# Patient Record
Sex: FEMALE | Race: BLACK OR AFRICAN AMERICAN | NOT HISPANIC OR LATINO | Employment: FULL TIME | URBAN - METROPOLITAN AREA
[De-identification: names, ages, dates, MRNs, and addresses within clinical notes are randomized per-mention and may not be internally consistent; named-entity substitution may affect disease eponyms.]

---

## 2017-02-15 ENCOUNTER — TELEPHONE (OUTPATIENT)
Dept: OBSTETRICS AND GYNECOLOGY | Facility: CLINIC | Age: 37
End: 2017-02-15

## 2017-02-15 DIAGNOSIS — Z36.89 ESTABLISH GESTATIONAL AGE, ULTRASOUND: Primary | ICD-10-CM

## 2017-02-15 NOTE — TELEPHONE ENCOUNTER
----- Message from Robert Pedroza sent at 2/14/2017  3:04 PM CST -----  Contact: Pt  X_ 1st Request  _ 2nd Request  _ 3rd Request    Who: FRANDY SMITH [[id number]]    Why: Patient would like to schedule her initial OB appointment, [facility] 12/14    What Number to Call Back: [phone]    When to Expect a call back: (Before the end of the day)  -- if call after 3:00 call back will be tomorrow.

## 2017-02-15 NOTE — TELEPHONE ENCOUNTER
Patient has been scheduled to see Raquel CLARK for New gyn visit. She report LMP as 12/14/16. Pt has also been scheduled for the next available clinic dating u/s 3/7/17.

## 2017-02-21 ENCOUNTER — LAB VISIT (OUTPATIENT)
Dept: LAB | Facility: OTHER | Age: 37
End: 2017-02-21
Attending: NURSE PRACTITIONER
Payer: MEDICAID

## 2017-02-21 ENCOUNTER — OFFICE VISIT (OUTPATIENT)
Dept: OBSTETRICS AND GYNECOLOGY | Facility: CLINIC | Age: 37
End: 2017-02-21
Payer: MEDICAID

## 2017-02-21 ENCOUNTER — TELEPHONE (OUTPATIENT)
Dept: OBSTETRICS AND GYNECOLOGY | Facility: CLINIC | Age: 37
End: 2017-02-21

## 2017-02-21 VITALS
BODY MASS INDEX: 33.07 KG/M2 | WEIGHT: 179.69 LBS | DIASTOLIC BLOOD PRESSURE: 72 MMHG | HEIGHT: 62 IN | SYSTOLIC BLOOD PRESSURE: 116 MMHG

## 2017-02-21 DIAGNOSIS — O98.419 CHRONIC HEPATITIS C DURING PREGNANCY, ANTEPARTUM: ICD-10-CM

## 2017-02-21 DIAGNOSIS — Z32.01 POSITIVE PREGNANCY TEST: ICD-10-CM

## 2017-02-21 DIAGNOSIS — Z12.4 PAP SMEAR FOR CERVICAL CANCER SCREENING: ICD-10-CM

## 2017-02-21 DIAGNOSIS — B18.2 CHRONIC HEPATITIS C DURING PREGNANCY, ANTEPARTUM: ICD-10-CM

## 2017-02-21 DIAGNOSIS — N91.5 OLIGOMENORRHEA: Primary | ICD-10-CM

## 2017-02-21 DIAGNOSIS — O98.419 CHRONIC HEPATITIS C AFFECTING PREGNANCY, ANTEPARTUM: ICD-10-CM

## 2017-02-21 DIAGNOSIS — Z72.0 TOBACCO ABUSE: ICD-10-CM

## 2017-02-21 DIAGNOSIS — B18.2 CHRONIC HEPATITIS C AFFECTING PREGNANCY, ANTEPARTUM: ICD-10-CM

## 2017-02-21 DIAGNOSIS — N91.5 OLIGOMENORRHEA: ICD-10-CM

## 2017-02-21 LAB
ABO + RH BLD: NORMAL
ALBUMIN SERPL BCP-MCNC: 3.5 G/DL
ALP SERPL-CCNC: 79 U/L
ALT SERPL W/O P-5'-P-CCNC: 39 U/L
AMPHET+METHAMPHET UR QL: NEGATIVE
ANION GAP SERPL CALC-SCNC: 6 MMOL/L
AST SERPL-CCNC: 28 U/L
B-HCG UR QL: POSITIVE
BARBITURATES UR QL SCN>200 NG/ML: NEGATIVE
BASOPHILS # BLD AUTO: 0.01 K/UL
BASOPHILS NFR BLD: 0.1 %
BENZODIAZ UR QL SCN>200 NG/ML: NEGATIVE
BILIRUB SERPL-MCNC: 0.3 MG/DL
BLD GP AB SCN CELLS X3 SERPL QL: NORMAL
BUN SERPL-MCNC: 13 MG/DL
BZE UR QL SCN: NEGATIVE
CALCIUM SERPL-MCNC: 8.9 MG/DL
CANNABINOIDS UR QL SCN: NEGATIVE
CHLORIDE SERPL-SCNC: 104 MMOL/L
CO2 SERPL-SCNC: 25 MMOL/L
CREAT SERPL-MCNC: 0.8 MG/DL
CREAT UR-MCNC: 59 MG/DL
CTP QC/QA: YES
DIFFERENTIAL METHOD: ABNORMAL
EOSINOPHIL # BLD AUTO: 0.3 K/UL
EOSINOPHIL NFR BLD: 3.9 %
ERYTHROCYTE [DISTWIDTH] IN BLOOD BY AUTOMATED COUNT: 18 %
EST. GFR  (AFRICAN AMERICAN): >60 ML/MIN/1.73 M^2
EST. GFR  (NON AFRICAN AMERICAN): >60 ML/MIN/1.73 M^2
ETHANOL UR-MCNC: <10 MG/DL
GLUCOSE SERPL-MCNC: 83 MG/DL
HBV SURFACE AG SERPL QL IA: NEGATIVE
HCT VFR BLD AUTO: 34.4 %
HGB BLD-MCNC: 11.4 G/DL
HGB S BLD QL SOLY: NEGATIVE
HIV 1+2 AB+HIV1 P24 AG SERPL QL IA: NEGATIVE
LYMPHOCYTES # BLD AUTO: 2.2 K/UL
LYMPHOCYTES NFR BLD: 32.3 %
MCH RBC QN AUTO: 26 PG
MCHC RBC AUTO-ENTMCNC: 33.1 %
MCV RBC AUTO: 78 FL
METHADONE UR QL SCN>300 NG/ML: NEGATIVE
MONOCYTES # BLD AUTO: 0.5 K/UL
MONOCYTES NFR BLD: 7.9 %
NEUTROPHILS # BLD AUTO: 3.8 K/UL
NEUTROPHILS NFR BLD: 55.8 %
OPIATES UR QL SCN: NEGATIVE
PCP UR QL SCN>25 NG/ML: NEGATIVE
PLATELET # BLD AUTO: 280 K/UL
PMV BLD AUTO: 11 FL
POTASSIUM SERPL-SCNC: 4.1 MMOL/L
PROT SERPL-MCNC: 7.9 G/DL
RBC # BLD AUTO: 4.39 M/UL
SODIUM SERPL-SCNC: 135 MMOL/L
TOXICOLOGY INFORMATION: NORMAL
WBC # BLD AUTO: 6.84 K/UL

## 2017-02-21 PROCEDURE — 88175 CYTOPATH C/V AUTO FLUID REDO: CPT

## 2017-02-21 PROCEDURE — 86703 HIV-1/HIV-2 1 RESULT ANTBDY: CPT

## 2017-02-21 PROCEDURE — 86762 RUBELLA ANTIBODY: CPT

## 2017-02-21 PROCEDURE — 86900 BLOOD TYPING SEROLOGIC ABO: CPT

## 2017-02-21 PROCEDURE — 85660 RBC SICKLE CELL TEST: CPT

## 2017-02-21 PROCEDURE — 80053 COMPREHEN METABOLIC PANEL: CPT

## 2017-02-21 PROCEDURE — 36415 COLL VENOUS BLD VENIPUNCTURE: CPT

## 2017-02-21 PROCEDURE — 87522 HEPATITIS C REVRS TRNSCRPJ: CPT

## 2017-02-21 PROCEDURE — 87340 HEPATITIS B SURFACE AG IA: CPT

## 2017-02-21 PROCEDURE — 99999 PR PBB SHADOW E&M-EST. PATIENT-LVL III: CPT | Mod: PBBFAC,,, | Performed by: NURSE PRACTITIONER

## 2017-02-21 PROCEDURE — 85025 COMPLETE CBC W/AUTO DIFF WBC: CPT

## 2017-02-21 PROCEDURE — 99204 OFFICE O/P NEW MOD 45 MIN: CPT | Mod: TH,S$PBB,, | Performed by: NURSE PRACTITIONER

## 2017-02-21 PROCEDURE — 86901 BLOOD TYPING SEROLOGIC RH(D): CPT

## 2017-02-21 PROCEDURE — 86592 SYPHILIS TEST NON-TREP QUAL: CPT

## 2017-02-21 RX ORDER — IBUPROFEN 200 MG
200 TABLET ORAL EVERY 6 HOURS PRN
COMMUNITY
End: 2017-03-09

## 2017-02-21 RX ORDER — TRAMADOL HYDROCHLORIDE 50 MG/1
50 TABLET ORAL EVERY 6 HOURS PRN
COMMUNITY
End: 2017-03-09

## 2017-02-21 NOTE — PROGRESS NOTES
"  CC: Positive Pregnancy Test    HISTORY OF PRESENT ILLNESS:    Bibiana Monroe is a 36 y.o. female, No obstetric history on file.,  Presents today for a routine exam complaining of amenorrhea and positive home urine pregnancy test.  Patient's last menstrual period was 2016.   She is not currently on any contraception.  Reports nausea- rare vomiting. Reports breast tenderness. Denies vaginal bleeding and pelvic pain.  Prior  X 4- one PTD at 36 weeks..  Medical history is complicated by Hep C (she has never been treated), EAB X2,  and tobacco abuse.       ROS:  GENERAL: No weight changes. No swelling. No fatigue. No fever.  CARDIOVASCULAR: No chest pain. No shortness of breath. No leg cramps.   NEUROLOGICAL: No headaches. No vision changes.  BREASTS: No pain. No lumps. No discharge.  ABDOMEN: No pain. No diarrhea. No constipation.  REPRODUCTIVE: No abnormal bleeding.   VULVA: No pain. No lesions. No itching.  VAGINA: No relaxation. No itching. No odor. No discharge. No lesions.  URINARY: No incontinence. No nocturia. No frequency. No dysuria.    MEDICATIONS AND ALLERGIES:  Reviewed        COMPREHENSIVE GYN HISTORY:  PAP History: Denies abnormal Paps.  Infection History: Denies STDs. Denies PID.  Benign History: Denies uterine fibroids. Denies ovarian cysts. Denies endometriosis. Denies other conditions.  Cancer History: Denies cervical cancer. Denies uterine cancer or hyperplasia. Denies ovarian cancer. Denies vulvar cancer or pre-cancer. Denies vaginal cancer or pre-cancer. Denies breast cancer. Denies colon cancer.  Sexual Activity History: Reports currently being sexually active  Menstrual History: None.  Contraception: None    Visit Vitals    /72    Ht 5' 2" (1.575 m)    Wt 81.5 kg (179 lb 10.8 oz)    LMP 2016    BMI 32.86 kg/m2       PE:  AFFECT: Calm, alert and oriented X 3. Interactive during exam  GENERAL: Appears well-nourished, well-developed, in no acute distress.  HEAD: " Normocephalic, atruamatic  TEETH: Good dentition.  THYROID: No thyromegally   BREASTS: No masses, skin changes, nipple discharge or adenopathy bilaterally.  SKIN: Normal for race, warm, & dry. No lesions or rashes.  LUNGS: Easy and unlabored, clear to auscultation bilaterally.  HEART: Regular rate and rhythm   ABDOMEN: Soft and nontender without masses or organomegally.  VULVA: No lesions, masses or tenderness.  VAGINA: Moist and well rugated without lesions or discharge.  CERVIX: Moist and pink without lesions, discharge or tenderness.      UTERUS SIZE: 14 week size, nontender and without masses.  ADNEXA: No masses or tenderness.  ESTIMATE OF PELVIC CAPACITY: Adequate  EXTREMITIES: No cyanosis, clubbing or edema. No calf tenderness.  LYMPH NODES: No axillary or inguinal adenopathy.    PROCEDURES:  UPT Positive  Genprobe  Pap      ASSESSMENT/PLAN:  Amenorrhea  Positive urine pregnancy test (ALICIA: 17, EGA: 9w6d based on LMP)    -  Routine prenatal care    Nausea and vomiting in pregnancy    -  Education regarding lifestyle and dietary modifications    -  Advised use of B6/Unisom. Pt will notify us if no relief/worsening symptoms, will consider Zofran if needed.      1st TRIMESTER COUNSELING: Discussed all, booklet provided:  Common complaints of pregnancy  HIV and other routine prenatal tests including  genetic screening  Risk factors identified by prenatal history  Oriented to practice - discussed anticipated course of prenatal care & indications for Ultrasound  Childbirth classes/Hospital facilities   Nutrition and weight gain counseling  Toxoplasmosis precautions (Cats/Raw Meat)  Sexual activity and exercise  Environmental/Work hazards  Travel  Tobacco (Ask, Advise, Assess, Assist, and Arrange), as well as alcohol and drug use  Use of any medications (Including supplements, Vitamins, Herbs, or OTC Drugs)  Domestic violence  Seat belt use      TERATOLOGY COUNSELING:   Discussed indications and options  for aneuploidy screening - pamphlets given    -  Pt desires [id number] and MFM notified  Referral to hepatology  Dating US scheduled  FOLLOW-UP in 4 weeks with Dr. Willie Portillo NP    OB/GYN

## 2017-02-21 NOTE — MR AVS SNAPSHOT
"    [facility] [address]  [address]  [place] [address]  Phone: [phone]  Fax: [phone]                  Bibiana Monroe   2017 8:40 AM   Office Visit    Description:  Female : 1980   Provider:  Nidia Portillo NP   Department:  [facility] [address]           Reason for Visit     Absent Menses           Diagnoses this Visit        Comments    Oligomenorrhea    -  Primary     Positive pregnancy test                To Do List           Future Appointments        Provider Department Dept Phone    3/7/2017 3:00 PM ULTRASOUND, [facility] [address] [phone]      Goals (5 Years of Data)     None      [facility]     [facility] Care [facility] -  Assistance  Registered nurses in the [facility] provide clinical advisement, health education, appointment booking, and other advisory services.  Call for this free service at [phone].             Medications           Message regarding Medications     Verify the changes and/or additions to your medication regime listed below are the same as discussed with your clinician today.  If any of these changes or additions are incorrect, please notify your healthcare provider.             Verify that the below list of medications is an accurate representation of the medications you are currently taking.  If none reported, the list may be blank. If incorrect, please contact your healthcare provider. Carry this list with you in case of emergency.           Current Medications     ibuprofen (ADVIL,MOTRIN) 200 MG tablet Take 200 mg by mouth every 6 (six) hours as needed for Pain.    tramadol (ULTRAM) 50 mg tablet Take 50 mg by mouth every 6 (six) hours as needed for Pain.           Clinical Reference Information           Your Vitals Were     BP Height Weight Last Period BMI    116/72 5' 2" (1.575 m) 81.5 kg (179 lb 10.8 oz) 2016 32.86 kg/m2      Blood Pressure          Most " Recent Value    BP  116/72      Allergies as of 2/21/2017     No Known Allergies      Immunizations Administered on Date of Encounter - 2/21/2017     None      Orders Placed During Today's Visit      Normal Orders This Visit    C. trachomatis/N. gonorrhoeae by AMP DNA Urine     POCT urine pregnancy     Urine culture     Future Labs/Procedures Expected by Expires    Basic metabolic panel  2/21/2017 4/22/2018    CBC auto differential  2/21/2017 4/22/2018    Hepatitis B surface antigen  2/21/2017 4/22/2018    HIV-1 and HIV-2 antibodies  2/21/2017 4/22/2018    RPR  2/21/2017 4/22/2018    Rubella antibody, IgG  2/21/2017 4/22/2018    Type & Screen  2/21/2017 4/22/2018      [facility] Sign-Up     Activating your [facility] account is as easy as 1-2-3!     1) Visit [email or web address], select Sign Up Now, enter this activation code and your date of birth, then select Next.  [email or web address]  Expires: 4/7/2017  9:02 AM      2) Create a username and password to use when you visit [facility] in the future and select a security question in case you lose your password and select Next.    3) Enter your e-mail address and click Sign Up!    Additional Information  If you have questions, please e-mail [email or web address] or call [phone] to talk to our [facility] staff. Remember, [facility] is NOT to be used for urgent needs. For medical emergencies, dial 911.         Smoking Cessation     If you would like to quit smoking:   You may be eligible for free services if you are a [place] resident and started smoking cigarettes before September 1, 1988.  Call the Smoking Cessation Trust (SCT) toll free at (705) 202-1761 or (745) 651-2881.   Call 3-671-QUIT-NOW if you do not meet the above criteria.            Language Assistance Services     ATTENTION: Language assistance services are available, free of charge. Please call [phone].      ATENCIÓN: Si habla español, tiene a veliz disposición servicios gratuitos de asistencia  lingüística. Howard al [phone].     LORIE Ý: N?u b?n nói Ti?ng Vi?t, có các d?ch v? h? tr? ngôn ng? mi?n phí dành cho b?n. G?i s? [phone].         [facility] [address] complies with applicable Federal civil rights laws and does not discriminate on the basis of race, color, national origin, age, disability, or sex.

## 2017-02-22 ENCOUNTER — PATIENT MESSAGE (OUTPATIENT)
Dept: OBSTETRICS AND GYNECOLOGY | Facility: CLINIC | Age: 37
End: 2017-02-22

## 2017-02-22 LAB
C TRACH DNA SPEC QL NAA+PROBE: NEGATIVE
N GONORRHOEA DNA SPEC QL NAA+PROBE: NEGATIVE
RPR SER QL: NORMAL
RUBV IGG SER-ACNC: 11.8 IU/ML
RUBV IGG SER-IMP: REACTIVE

## 2017-02-23 LAB — BACTERIA UR CULT: NO GROWTH

## 2017-02-24 LAB
HCV LOG: 4.32 LOG (10) IU/ML
HCV RNA QUANT PCR: ABNORMAL IU/ML
HCV, QUALITATIVE: DETECTED IU/ML

## 2017-03-01 ENCOUNTER — TELEPHONE (OUTPATIENT)
Dept: OBSTETRICS AND GYNECOLOGY | Facility: CLINIC | Age: 37
End: 2017-03-01

## 2017-03-01 ENCOUNTER — PATIENT MESSAGE (OUTPATIENT)
Dept: OBSTETRICS AND GYNECOLOGY | Facility: CLINIC | Age: 37
End: 2017-03-01

## 2017-03-01 DIAGNOSIS — A59.01 TRICHOMONAL VAGINITIS DURING PREGNANCY IN FIRST TRIMESTER: ICD-10-CM

## 2017-03-01 DIAGNOSIS — O23.591 TRICHOMONAL VAGINITIS DURING PREGNANCY IN FIRST TRIMESTER: ICD-10-CM

## 2017-03-01 NOTE — TELEPHONE ENCOUNTER
Call to notify patient of abnormal pap results, no answer, left message for patient to call office.

## 2017-03-02 ENCOUNTER — TELEPHONE (OUTPATIENT)
Dept: OBSTETRICS AND GYNECOLOGY | Facility: CLINIC | Age: 37
End: 2017-03-02

## 2017-03-02 DIAGNOSIS — O20.0 THREATENED ABORTION: Primary | ICD-10-CM

## 2017-03-02 NOTE — TELEPHONE ENCOUNTER
Called and notified patient of abnormal culture results. Patient verbalized understanding. Patient stated that she went to the ER due to vaginal bleeding, possible missed  AB. Advise patient to come in to have blood work done.

## 2017-03-03 ENCOUNTER — HOSPITAL ENCOUNTER (EMERGENCY)
Facility: OTHER | Age: 37
Discharge: HOME | End: 2017-03-03
Attending: EMERGENCY MEDICINE
Payer: MEDICAID

## 2017-03-03 VITALS
BODY MASS INDEX: 31.83 KG/M2 | HEIGHT: 62 IN | TEMPERATURE: 99 F | WEIGHT: 173 LBS | SYSTOLIC BLOOD PRESSURE: 117 MMHG | HEART RATE: 87 BPM | OXYGEN SATURATION: 100 % | DIASTOLIC BLOOD PRESSURE: 62 MMHG | RESPIRATION RATE: 14 BRPM

## 2017-03-03 DIAGNOSIS — O20.0 PREGNANCY WITH THREATENED ABORTION: Primary | ICD-10-CM

## 2017-03-03 DIAGNOSIS — N93.9 VAGINAL BLEEDING: ICD-10-CM

## 2017-03-03 DIAGNOSIS — R10.30 LOWER ABDOMINAL PAIN: ICD-10-CM

## 2017-03-03 LAB
ABO + RH BLD: NORMAL
ALBUMIN SERPL BCP-MCNC: 3.5 G/DL
ALP SERPL-CCNC: 78 U/L
ALT SERPL W/O P-5'-P-CCNC: 46 U/L
ANION GAP SERPL CALC-SCNC: 9 MMOL/L
AST SERPL-CCNC: 29 U/L
B-HCG UR QL: POSITIVE
BACTERIA #/AREA URNS HPF: ABNORMAL /HPF
BASOPHILS # BLD AUTO: 0.01 K/UL
BASOPHILS NFR BLD: 0.1 %
BILIRUB SERPL-MCNC: 0.6 MG/DL
BILIRUB UR QL STRIP: ABNORMAL
BUN SERPL-MCNC: 10 MG/DL
CALCIUM SERPL-MCNC: 9.2 MG/DL
CHLORIDE SERPL-SCNC: 103 MMOL/L
CLARITY UR: ABNORMAL
CO2 SERPL-SCNC: 24 MMOL/L
COLOR UR: ABNORMAL
CREAT SERPL-MCNC: 0.8 MG/DL
CTP QC/QA: YES
DIFFERENTIAL METHOD: ABNORMAL
EOSINOPHIL # BLD AUTO: 0.3 K/UL
EOSINOPHIL NFR BLD: 3 %
ERYTHROCYTE [DISTWIDTH] IN BLOOD BY AUTOMATED COUNT: 17.9 %
EST. GFR  (AFRICAN AMERICAN): >60 ML/MIN/1.73 M^2
EST. GFR  (NON AFRICAN AMERICAN): >60 ML/MIN/1.73 M^2
GLUCOSE SERPL-MCNC: 105 MG/DL
GLUCOSE UR QL STRIP: NEGATIVE
HCG INTACT+B SERPL-ACNC: 6309 MIU/ML
HCT VFR BLD AUTO: 36.5 %
HGB BLD-MCNC: 12 G/DL
HGB UR QL STRIP: ABNORMAL
HYALINE CASTS #/AREA URNS LPF: 0 /LPF
KETONES UR QL STRIP: ABNORMAL
LEUKOCYTE ESTERASE UR QL STRIP: ABNORMAL
LYMPHOCYTES # BLD AUTO: 2.6 K/UL
LYMPHOCYTES NFR BLD: 31.7 %
MCH RBC QN AUTO: 26.1 PG
MCHC RBC AUTO-ENTMCNC: 32.9 %
MCV RBC AUTO: 80 FL
MICROSCOPIC COMMENT: ABNORMAL
MONOCYTES # BLD AUTO: 0.6 K/UL
MONOCYTES NFR BLD: 7.2 %
NEUTROPHILS # BLD AUTO: 4.8 K/UL
NEUTROPHILS NFR BLD: 57.8 %
NITRITE UR QL STRIP: NEGATIVE
PH UR STRIP: 6 [PH] (ref 5–8)
PLATELET # BLD AUTO: 321 K/UL
PMV BLD AUTO: 10 FL
POTASSIUM SERPL-SCNC: 3.9 MMOL/L
PROT SERPL-MCNC: 8.2 G/DL
PROT UR QL STRIP: ABNORMAL
RBC # BLD AUTO: 4.59 M/UL
RBC #/AREA URNS HPF: >100 /HPF (ref 0–4)
SODIUM SERPL-SCNC: 136 MMOL/L
SP GR UR STRIP: >=1.03 (ref 1–1.03)
URN SPEC COLLECT METH UR: ABNORMAL
UROBILINOGEN UR STRIP-ACNC: 1 EU/DL
WBC # BLD AUTO: 8.24 K/UL
WBC #/AREA URNS HPF: 10 /HPF (ref 0–5)

## 2017-03-03 PROCEDURE — 86900 BLOOD TYPING SEROLOGIC ABO: CPT

## 2017-03-03 PROCEDURE — 86901 BLOOD TYPING SEROLOGIC RH(D): CPT

## 2017-03-03 PROCEDURE — 96360 HYDRATION IV INFUSION INIT: CPT

## 2017-03-03 PROCEDURE — 99285 EMERGENCY DEPT VISIT HI MDM: CPT | Mod: 25

## 2017-03-03 PROCEDURE — 85025 COMPLETE CBC W/AUTO DIFF WBC: CPT

## 2017-03-03 PROCEDURE — 80053 COMPREHEN METABOLIC PANEL: CPT

## 2017-03-03 PROCEDURE — 81025 URINE PREGNANCY TEST: CPT | Performed by: EMERGENCY MEDICINE

## 2017-03-03 PROCEDURE — 84702 CHORIONIC GONADOTROPIN TEST: CPT

## 2017-03-03 PROCEDURE — 96361 HYDRATE IV INFUSION ADD-ON: CPT

## 2017-03-03 PROCEDURE — 81000 URINALYSIS NONAUTO W/SCOPE: CPT

## 2017-03-03 PROCEDURE — 25000003 PHARM REV CODE 250: Performed by: EMERGENCY MEDICINE

## 2017-03-03 RX ORDER — ACETAMINOPHEN 325 MG/1
650 TABLET ORAL
Status: COMPLETED | OUTPATIENT
Start: 2017-03-03 | End: 2017-03-03

## 2017-03-03 RX ORDER — OXYCODONE AND ACETAMINOPHEN 5; 325 MG/1; MG/1
1 TABLET ORAL EVERY 4 HOURS PRN
Qty: 12 TABLET | Refills: 0 | Status: SHIPPED | OUTPATIENT
Start: 2017-03-03 | End: 2017-03-09

## 2017-03-03 RX ORDER — MISOPROSTOL 200 UG/1
800 TABLET ORAL
Status: COMPLETED | OUTPATIENT
Start: 2017-03-03 | End: 2017-03-03

## 2017-03-03 RX ORDER — SODIUM CHLORIDE 9 MG/ML
125 INJECTION, SOLUTION INTRAVENOUS CONTINUOUS
Status: DISCONTINUED | OUTPATIENT
Start: 2017-03-03 | End: 2017-03-03 | Stop reason: HOSPADM

## 2017-03-03 RX ADMIN — ACETAMINOPHEN 650 MG: 325 TABLET ORAL at 08:03

## 2017-03-03 RX ADMIN — SODIUM CHLORIDE 1000 ML: 0.9 INJECTION, SOLUTION INTRAVENOUS at 07:03

## 2017-03-03 RX ADMIN — SODIUM CHLORIDE 125 ML/HR: 0.9 INJECTION, SOLUTION INTRAVENOUS at 08:03

## 2017-03-03 RX ADMIN — MISOPROSTOL 800 MCG: 200 TABLET ORAL at 11:03

## 2017-03-03 NOTE — ED PROVIDER NOTES
"Encounter Date: 3/3/2017    SCRIBE #1 NOTE: I, Bk Prince, am scribing for, and in the presence of,  Dr. Mcpherson. I have scribed the entire note.       History     Chief Complaint   Patient presents with    Vaginal Bleeding     Patient is pregnant, unsure of how far along.  Patient stated she had a positive test in January.  Patient c/o vaginal bleeding that started Monday as spotting and this morning when she took a bath she began to bleed alot more and was passing clots.  Patient stated that she was told she was having a threatened miscarriage at a prior visit.      Review of patient's allergies indicates:  No Known Allergies  HPI Comments: Time seen by provider: 7:17 AM    This is a 36 y.o. female with Hepatitis C who presents A1 with complaint of vaginal bleeding. The bleeding started 3 days ago as "spotting." She has been using 1-2 pads per day. She notes accompanying abdominal pain described as "cramping." She reports that she had a positive pregnancy test sometime in January and was seen 2 days ago at [facility] where she had an US done which showed a 5 weeks, 4 day pregnancy and was diagnosed with a threatened miscarriage. She denies fever, nausea, vomiting and dysuria. She is not taking prenatal vitamins. She has NKDA. She is a smoker.     The history is provided by the patient and a significant other.     History reviewed. No pertinent past medical history.  History reviewed. No pertinent surgical history.  Family History   Problem Relation Age of Onset    No Known Problems Father     No Known Problems Mother     No Known Problems Brother     No Known Problems Sister      Social History   Substance Use Topics    Smoking status: Current Every Day Smoker    Smokeless tobacco: None    Alcohol use No     Review of Systems   Constitutional: Negative for chills, diaphoresis and fever.   HENT: Negative for congestion and sore throat.    Eyes: Negative for pain.   Respiratory: " Negative for shortness of breath.    Cardiovascular: Negative for chest pain.   Gastrointestinal: Positive for abdominal pain. Negative for diarrhea, nausea and vomiting.   Genitourinary: Positive for vaginal bleeding. Negative for difficulty urinating, dysuria and hematuria.   Musculoskeletal: Negative for myalgias.   Skin: Negative for color change.   Neurological: Negative for numbness and headaches.   Psychiatric/Behavioral: Negative for behavioral problems and confusion.       Physical Exam   Initial Vitals   BP Pulse Resp Temp SpO2   03/03/17 0655 03/03/17 0655 03/03/17 0655 03/03/17 0655 03/03/17 0655   113/75 112 14 98.4 °F (36.9 °C) 100 %     Physical Exam    Nursing note and vitals reviewed.  Constitutional: She appears well-developed and well-nourished. She is not diaphoretic. No distress.   HENT:   Head: Normocephalic and atraumatic.   Mouth/Throat: Oropharynx is clear and moist.   Eyes: Conjunctivae are normal. Pupils are equal, round, and reactive to light. Right eye exhibits no discharge. Left eye exhibits no discharge.   Neck: Normal range of motion. Neck supple.   Cardiovascular: Regular rhythm and normal heart sounds. Tachycardia present.  Exam reveals no gallop and no friction rub.    No murmur heard.  Pulmonary/Chest: Breath sounds normal. No respiratory distress. She has no wheezes. She has no rhonchi. She has no rales.   Abdominal: Soft. Bowel sounds are normal. She exhibits no distension. There is tenderness (mild) in the suprapubic area. There is no rebound and no guarding.   Genitourinary: Rectum normal. Pelvic exam was performed with patient prone. There is no tenderness or lesion on the right labia. There is no tenderness on the left labia. Cervix exhibits no motion tenderness. Right adnexum displays no mass and no tenderness. Left adnexum displays no mass and no tenderness. There is bleeding in the vagina.   Genitourinary Comments: Large amount of blood in vault with moderate size clots,  3 cm tissue removed from cervical os   Musculoskeletal: Normal range of motion. She exhibits no edema or tenderness.   Neurological: She is alert and oriented to person, place, and time. She has normal strength. No sensory deficit.   Skin: Skin is warm and dry. No rash and no abscess noted. No erythema. No pallor.   Psychiatric: She has a normal mood and affect. Her behavior is normal. Judgment and thought content normal.         ED Course   Procedures  Labs Reviewed   CBC W/ AUTO DIFFERENTIAL - Abnormal; Notable for the following:        Result Value    Hematocrit 36.5 (*)     MCV 80 (*)     MCH 26.1 (*)     RDW 17.9 (*)     All other components within normal limits   COMPREHENSIVE METABOLIC PANEL - Abnormal; Notable for the following:     ALT 46 (*)     All other components within normal limits   URINALYSIS - Abnormal; Notable for the following:     Appearance, UA Cloudy (*)     Specific Gravity, UA >=1.030 (*)     Protein, UA 2+ (*)     Ketones, UA Trace (*)     Bilirubin (UA) 1+ (*)     Occult Blood UA 3+ (*)     Leukocytes, UA Trace (*)     All other components within normal limits   URINALYSIS MICROSCOPIC - Abnormal; Notable for the following:     RBC, UA >100 (*)     WBC, UA 10 (*)     Bacteria, UA Few (*)     All other components within normal limits   POCT URINE PREGNANCY - Abnormal; Notable for the following:     POC Preg Test, Ur Positive (*)     All other components within normal limits   HCG, QUANTITATIVE, PREGNANCY   HCG, QUANTITATIVE, PREGNANCY   GROUP & RH              Imaging Results         US OB Less Than 14 Wks First Gestation (Final result) Result time:  03/03/17 10:11:34    Final result by Ivan Mccall MD (03/03/17 10:11:34)    Impression:      Pregnancy of unknown location, with no intrauterine gestation identified.        Electronically signed by: IVAN MCCALL  Date:     03/03/17  Time:    10:11     Narrative:    HISTORY:  Lack of growth..  Dates by last menstrual period 11 weeks 2  days.      TECHNIQUE: Transabdominal and transvaginal obstetrical ultrasound of the pelvis     COMPARISON: None    FINDINGS:  Uterus measures 9.8 x 5.6 x 6.0 cm.  There is no identifiable intrauterine gestation.  Endometrium is diffusely mildly thickened with minimal fluid in the endometrial canal.  Uterus is normal in appearance without focal abnormality.    Right ovary measures 2.7 x 2.4 x 2.6 cm.  There is appropriate arterial and venous flow.  Left ovary measures 2.6 x 0.1 x 2.1 cm.  There is appropriate arterial and venous flow.             Medical Decision Making:   Initial Assessment:   Urgent evaluation of 36-year-old female  currently pregnant presenting with vaginal bleeding and abdominal cramping.  Patient was recently seen in [place] had the same complaint, and she endorses being diagnosed with a threatened , cannot recall hCG value, endorses positive urine pregnancy test in January.  Patient presents today given worsening abdominal cramping and persistent vaginal bleeding.  On exam patient is mildly tachycardic, diffuse abdominal discomfort, tenderness over suprapubic region, really mild amount of blood and clots in the vaginal canal, tissue removed from os.  I suspect active miscarriage at this time, We'll obtain beta, Rh status, ultrasound and reassess.  Clinical Tests:   Lab Tests: Ordered and Reviewed  Radiological Study: Ordered and Reviewed  ED Management:  10:26 AM  US performed w no IUP identified. Message relayed to OBGYN resident, awaiting response regarding cytotec therapy.     Patient treated with Cytotec per patient preference and agreement with Dr. rachel.  Patient to follow-up with OB/GYN for repeat beta hCG.            Scribe Attestation:   Scribe #1: I performed the above scribed service and the documentation accurately describes the services I performed. I attest to the accuracy of the note.    Attending Attestation:           Physician Attestation for  Scribe:  Physician Attestation Statement for Scribe #1: I, Dr. Mcpherson, reviewed documentation, as scribed by Bk Prince in my presence, and it is both accurate and complete.                 ED Course     Clinical Impression:     1. Pregnancy with threatened     2. Vaginal bleeding    3. Lower abdominal pain         Disposition:   Disposition: Discharged  Condition: Stable       Di Mcpherson MD  17 [phone]

## 2017-03-03 NOTE — ED NOTES
Discharge papers and prescriptions reviewed with patient.  Patient verbalized understanding.  Patient directed to discharge window.  Paperwork given to registration. Patient ambulated to the discharge window with a steady and independent gait.  No signs of distress noted.

## 2017-03-03 NOTE — ED NOTES
Bedside commode brought to patient bedside due to complaint.  Patient informed of need for urine sample.

## 2017-03-03 NOTE — ED AVS SNAPSHOT
[facility]  [address] [address]               Bibiana Monroe   3/3/2017  6:51 AM   ED    Description:  Female : 1980   Department:  [facility]           Your Care was Coordinated By:     Provider Role From To    Di Mcpherson MD Attending Provider 17 0705 --      Reason for Visit     Vaginal Bleeding           Diagnoses this Visit        Comments    Pregnancy with threatened     -  Primary     Vaginal bleeding         Lower abdominal pain           ED Disposition     ED Disposition Condition Comment    Discharge             To Do List           Follow-up Information     Follow up with Reba Tapia DO. Schedule an appointment as soon as possible for a visit in 1 week.    Specialty:  Obstetrics and Gynecology    Why:  post-ED appointment    Contact information:    [address]  [address]  [place] [address] [phone]          Go to [facility].    Why:  If symptoms worsen- heavy bleeding, dizziness, vomitting    Contact information:    [address] [address]        [facility]     [facility] Care [facility] -  Assistance  Registered nurses in the [facility] provide clinical advisement, health education, appointment booking, and other advisory services.  Call for this free service at [phone].             Medications           Message regarding Medications     Verify the changes and/or additions to your medication regime listed below are the same as discussed with your clinician today.  If any of these changes or additions are incorrect, please notify your healthcare provider.        These medications were administered today        Dose Freq    sodium chloride 0.9% bolus 1,000 mL 1,000 mL ED 1 Time    Sig: Inject 1,000 mLs into the vein ED 1 Time.    Class: Normal    Route: Intravenous    0.9%  NaCl infusion 125 mL/hr Continuous  "   Sig: Inject 125 mL/hr into the vein continuous.    Class: Normal    Route: Intravenous    acetaminophen tablet 650 mg 650 mg ED 1 Time    Sig: Take 2 tablets (650 mg total) by mouth ED 1 Time.    Class: Normal    Route: Oral    misoprostol tablet 800 mcg 800 mcg ED 1 Time    Sig: Place 4 tablets (800 mcg total) vaginally ED 1 Time.    Class: Normal    Route: Vaginal           Verify that the below list of medications is an accurate representation of the medications you are currently taking.  If none reported, the list may be blank. If incorrect, please contact your healthcare provider. Carry this list with you in case of emergency.           Current Medications     0.9%  NaCl infusion Inject 125 mL/hr into the vein continuous.    ibuprofen (ADVIL,MOTRIN) 200 MG tablet Take 200 mg by mouth every 6 (six) hours as needed for Pain.    tramadol (ULTRAM) 50 mg tablet Take 50 mg by mouth every 6 (six) hours as needed for Pain.           Clinical Reference Information           Your Vitals Were     BP Pulse Temp Resp Height Weight    110/68 94 98.4 °F (36.9 °C) 14 5' 2" (1.575 m) 78.5 kg (173 lb)    Last Period SpO2 BMI          11/14/2016 100% 31.64 kg/m2        Allergies as of 3/3/2017     No Known Allergies      Immunizations Administered on Date of Encounter - 3/3/2017     None      ED Micro, Lab, POCT     Start Ordered       Status Ordering Provider    03/03/17 0806 03/03/17 0805  hCG, quantitative, pregnancy  Add-on      Completed     03/03/17 0706 03/03/17 0706  CBC W/ AUTO DIFFERENTIAL  STAT      Final result     03/03/17 0706 03/03/17 0706  Comp. Metabolic Panel  STAT      Final result     03/03/17 0706 03/03/17 0706    Once,   Status:  Canceled      Canceled     03/03/17 0706 03/03/17 0706  POCT urine pregnancy  Once      Final result     03/03/17 0706 03/03/17 0706  Urinalysis  STAT      Final result     03/03/17 0706 03/03/17 0706  hCG, quantitative  Once      Final result     03/03/17 0706 03/03/17 0706  " Urinalysis Microscopic  Once      Final result     03/03/17 0000 03/03/17 1047  HCG, QUANTITATIVE, PREGNANCY      Ordered       ED Imaging Orders     Start Ordered       Status Ordering Provider    03/03/17 0819 03/03/17 0818   OB Less Than 14 Wks First Gestation  1 time imaging      Final result       Discharge References/Attachments     MISCARRIAGE, SPONTANEOUS (COMPLETED) (ENGLISH)    MISOPROSTOL TABLETS (ENGLISH)      Your Scheduled Appointments     Mar 07, 2017  3:00 PM CST   Ultrasound with ULTRASOUND, [facility]   [facility] [address] ([facility]    [address]  [place] [address]   [phone]            Mar 08, 2017  8:00 AM CST   Consult with [facility] Alonso - Hepatology ([address] )    [address]  [facility] [address]   [phone]            Mar 21, 2017  9:15 AM CDT   New Patient with Reba Tapia DO   [facility] [address] ([facility])    [address]  [place] [address]   [phone]               [facility] complies with applicable Federal civil rights laws and does not discriminate on the basis of race, color, national origin, age, disability, or sex.        Language Assistance Services     ATTENTION: Language assistance services are available, free of charge. Please call [phone].      ATENCIÓN: Si habla español, tiene a veliz disposición servicios gratuitos de asistencia lingüística. Llame al [phone].     CHÚ Ý: N?u b?n nói Ti?ng Vi?t, có các d?ch v? h? tr? ngôn ng? mi?n phí dành cho b?n. G?i s? [phone].

## 2017-03-07 ENCOUNTER — PATIENT MESSAGE (OUTPATIENT)
Dept: OBSTETRICS AND GYNECOLOGY | Facility: CLINIC | Age: 37
End: 2017-03-07

## 2017-03-08 ENCOUNTER — TELEPHONE (OUTPATIENT)
Dept: HEPATOLOGY | Facility: CLINIC | Age: 37
End: 2017-03-08

## 2017-03-08 NOTE — TELEPHONE ENCOUNTER
Chart reviewed. HCV RNA noted. CBC and CMP done 2/21. Per ER notes on 3/3, pt had recent miscarriage.    Pt was incorrectly scheduled for consult on today but she missed appt. Additional labs and imaging still needed.  Attempted to speak with her to reschedule. Left message with call back number.

## 2017-03-09 ENCOUNTER — OFFICE VISIT (OUTPATIENT)
Dept: OBSTETRICS AND GYNECOLOGY | Facility: CLINIC | Age: 37
End: 2017-03-09
Payer: MEDICAID

## 2017-03-09 ENCOUNTER — TELEPHONE (OUTPATIENT)
Dept: HEPATOLOGY | Facility: CLINIC | Age: 37
End: 2017-03-09

## 2017-03-09 ENCOUNTER — LAB VISIT (OUTPATIENT)
Dept: LAB | Facility: OTHER | Age: 37
End: 2017-03-09
Attending: NURSE PRACTITIONER
Payer: MEDICAID

## 2017-03-09 VITALS
DIASTOLIC BLOOD PRESSURE: 66 MMHG | WEIGHT: 179.44 LBS | SYSTOLIC BLOOD PRESSURE: 104 MMHG | BODY MASS INDEX: 33.02 KG/M2 | HEIGHT: 62 IN

## 2017-03-09 DIAGNOSIS — A59.9 TRICHOMONIASIS: ICD-10-CM

## 2017-03-09 DIAGNOSIS — R76.8 HEPATITIS C ANTIBODY TEST POSITIVE: ICD-10-CM

## 2017-03-09 DIAGNOSIS — O02.1 MISSED AB: Primary | ICD-10-CM

## 2017-03-09 DIAGNOSIS — O02.1 MISSED AB: ICD-10-CM

## 2017-03-09 LAB — HCG INTACT+B SERPL-ACNC: 146 MIU/ML

## 2017-03-09 PROCEDURE — 99213 OFFICE O/P EST LOW 20 MIN: CPT | Mod: TH,S$PBB,, | Performed by: NURSE PRACTITIONER

## 2017-03-09 PROCEDURE — 36415 COLL VENOUS BLD VENIPUNCTURE: CPT

## 2017-03-09 PROCEDURE — 84702 CHORIONIC GONADOTROPIN TEST: CPT

## 2017-03-09 PROCEDURE — 99999 PR PBB SHADOW E&M-EST. PATIENT-LVL III: CPT | Mod: PBBFAC,,, | Performed by: NURSE PRACTITIONER

## 2017-03-09 RX ORDER — METRONIDAZOLE 500 MG/1
500 TABLET ORAL 2 TIMES DAILY
Qty: 14 TABLET | Refills: 0 | Status: SHIPPED | OUTPATIENT
Start: 2017-03-09 | End: 2017-03-16

## 2017-03-09 RX ORDER — METRONIDAZOLE 500 MG/1
500 TABLET ORAL 2 TIMES DAILY
Qty: 14 TABLET | Refills: 1 | Status: SHIPPED | OUTPATIENT
Start: 2017-03-09 | End: 2017-03-16

## 2017-03-09 NOTE — MR AVS SNAPSHOT
"    [facility] [address]  [address]  [facility] [address]  Phone: [phone]  Fax: [phone]                  Bibiana Monroe   3/9/2017 11:00 AM   Office Visit    Description:  Female : 1980   Provider:  Nidia Portillo NP   Department:  [facility] [address]           Reason for Visit     Miscarriage                To Do List           Goals (5 Years of Data)     None      [facility] On [facility]     [facility] On Call Nurse [facility] -  Assistance  Registered nurses in the [facility] On Call Center provide clinical advisement, health education, appointment booking, and other advisory services.  Call for this free service at [phone].             Medications           Message regarding Medications     Verify the changes and/or additions to your medication regime listed below are the same as discussed with your clinician today.  If any of these changes or additions are incorrect, please notify your healthcare provider.        STOP taking these medications     ibuprofen (ADVIL,MOTRIN) 200 MG tablet Take 200 mg by mouth every 6 (six) hours as needed for Pain.    oxycodone-acetaminophen (PERCOCET) 5-325 mg per tablet Take 1 tablet by mouth every 4 (four) hours as needed for Pain.    tramadol (ULTRAM) 50 mg tablet Take 50 mg by mouth every 6 (six) hours as needed for Pain.           Verify that the below list of medications is an accurate representation of the medications you are currently taking.  If none reported, the list may be blank. If incorrect, please contact your healthcare provider. Carry this list with you in case of emergency.           Current Medications            Clinical Reference Information           Your Vitals Were     BP Height Weight Last Period BMI    104/66 5' 2" (1.575 m) 81.4 kg (179 lb 7.3 oz) 2016 32.82 kg/m2      Blood Pressure          Most Recent Value    BP  104/66      Allergies as of 3/9/2017     No Known Allergies      Immunizations " Administered on Date of Encounter - 3/9/2017     None      Smoking Cessation     If you would like to quit smoking:   You may be eligible for free services if you are a [place] resident and started smoking cigarettes before September 1, 1988.  Call the [employer] ([employer]) toll free at (427) 015-6950 or (406) 198-4926.   Call 0-015-QUIT-NOW if you do not meet the above criteria.            Language Assistance Services     ATTENTION: Language assistance services are available, free of charge. Please call [phone].      ATENCIÓN: Si habla español, tiene a veliz disposición servicios gratuitos de asistencia lingüística. Llame al [phone].     CHÚ Ý: N?u b?n nói Ti?ng Vi?t, có các d?ch v? h? tr? ngôn ng? mi?n phí dành cho b?n. G?i s? [phone].         [facility] [address] complies with applicable Federal civil rights laws and does not discriminate on the basis of race, color, national origin, age, disability, or sex.

## 2017-03-09 NOTE — TELEPHONE ENCOUNTER
----- Message from Araseli Garcia sent at 3/9/2017 11:49 AM CST -----  Contact: self  Please contact patient to schedule a new patient appointment. Patient + Hep C with recent miscarriage. Referral in [facility]. Patient can be reached at [phone]    Thank you!

## 2017-03-09 NOTE — PROGRESS NOTES
CC: Missed     HPI: Pt is a 36 y.o.  female who presents for complaining of  A miscarriage.  She went to the ED on 3/3/17 with vaginal bleeding and then passed all products of conception at home.  Reports cessation of vaginal bleeding has occurred.  Repots she is still experiencing some cramping.  She does not desire future fertility.  She desires BTL for contraception.  She has not yet seen hepatology for Hep C +.        ROS:  GENERAL: Feeling well overall. Denies fever or chills.   SKIN: Denies rash or lesions.   HEAD: Denies head injury or headache.   NODES: Denies enlarged lymph nodes.   CHEST: Denies chest pain or shortness of breath.   CARDIOVASCULAR: Denies palpitations or left sided chest pain.   ABDOMEN: No abdominal pain, constipation, diarrhea, nausea, vomiting or rectal bleeding.   URINARY: No dysuria, hematuria, or burning on urination.  REPRODUCTIVE: See HPI.   BREASTS: Denies pain, lumps, or nipple discharge.   HEMATOLOGIC: No easy bruisability or excessive bleeding.   MUSCULOSKELETAL: Denies joint pain or swelling.   NEUROLOGIC: Denies syncope or weakness.   PSYCHIATRIC: Denies depression, anxiety or mood swings.    PE:   APPEARANCE: Well nourished, well developed, Black or  female in no acute distress.  VULVA: No lesions. Normal external female genitalia.  URETHRAL MEATUS: Normal size and location, no lesions, no prolapse.  URETHRA: No masses, tenderness, or prolapse.  VAGINA: Moist. No lesions or lacerations noted. No abnormal discharge present. No odor present.   CERVIX: No lesions or discharge. No cervical motion tenderness.   UTERUS: Normal size, regular shape, mobile, non-tender.  ADNEXA: No tenderness. No fullness or masses palpated in the adnexal regions.   ANUS PERINEUM: Normal.        Diagnosis:  1. Missed ab    2. Trichomoniasis    3. Hepatitis C antibody test positive        Plan:     Orders Placed This Encounter    hCG, quantitative    Ambulatory Referral to  Hepatitis C    metronidazole (FLAGYL) 500 MG tablet    metronidazole (FLAGYL) 500 MG tablet         Follow up hCG   Discussed to call and RTC if  Has a Fever above 100.4°F or chills, bright red vaginal bleeding that soaks more than 1 pad per hour, or a foul smelling discharge  Discussed Naproxen PRN discomfort.  Flagyl for trichomoniasis with refill for partner.  Discussed to avoid alcohol while taking the medication and for 24 hours after the last dose.  Discussed to abstain from sex while taking the medication and for 7 days after completing the medication  Referral to Hepatology for Hep C treatment  F/U with Dr. Tapia in 4 weeks for LILY and to discuss BTL        [employer] [place]-C

## 2017-03-10 ENCOUNTER — TELEPHONE (OUTPATIENT)
Dept: OBSTETRICS AND GYNECOLOGY | Facility: CLINIC | Age: 37
End: 2017-03-10

## 2017-03-12 ENCOUNTER — PATIENT MESSAGE (OUTPATIENT)
Dept: OBSTETRICS AND GYNECOLOGY | Facility: CLINIC | Age: 37
End: 2017-03-12

## 2017-03-13 ENCOUNTER — TELEPHONE (OUTPATIENT)
Dept: OBSTETRICS AND GYNECOLOGY | Facility: CLINIC | Age: 37
End: 2017-03-13

## 2017-03-13 NOTE — TELEPHONE ENCOUNTER
Called to verify if patient  Rx that was sent to pharmacy on 3/9/17. No answer, left message to call office.

## 2017-03-14 ENCOUNTER — TELEPHONE (OUTPATIENT)
Dept: OBSTETRICS AND GYNECOLOGY | Facility: CLINIC | Age: 37
End: 2017-03-14

## 2017-03-14 ENCOUNTER — TELEPHONE (OUTPATIENT)
Dept: HEPATOLOGY | Facility: CLINIC | Age: 37
End: 2017-03-14

## 2017-03-14 NOTE — TELEPHONE ENCOUNTER
2nd attempt to reach pt unsuccessful. Left message requesting that pt call back to schedule. Given direct number.

## 2017-03-14 NOTE — TELEPHONE ENCOUNTER
Spoke with patient and informed her that Rx was given with a refill. Patient verbalized understanding.

## 2017-03-14 NOTE — TELEPHONE ENCOUNTER
----- Message from Delroy Coyne sent at 3/14/2017  3:06 PM CDT -----  Contact: pt  x_ 1st Request   _ 2nd Request   _ 3rd Request     Who: FRANDY SMITH [[id number]]    Why: Pt missed your call is requesting a call back    What Number to Call Back: [phone]    When to Expect a call back: (Before the end of the day)   -- if call after 3:00 call back will be tomorrow.

## 2017-03-17 ENCOUNTER — TELEPHONE (OUTPATIENT)
Dept: OBSTETRICS AND GYNECOLOGY | Facility: CLINIC | Age: 37
End: 2017-03-17

## 2017-03-17 ENCOUNTER — LAB VISIT (OUTPATIENT)
Dept: LAB | Facility: OTHER | Age: 37
End: 2017-03-17
Attending: NURSE PRACTITIONER
Payer: MEDICAID

## 2017-03-17 DIAGNOSIS — O02.1 MISSED AB: ICD-10-CM

## 2017-03-17 LAB — HCG INTACT+B SERPL-ACNC: 18 MIU/ML

## 2017-03-17 PROCEDURE — 36415 COLL VENOUS BLD VENIPUNCTURE: CPT

## 2017-03-17 PROCEDURE — 84702 CHORIONIC GONADOTROPIN TEST: CPT

## 2017-03-17 NOTE — TELEPHONE ENCOUNTER
Informed patient that her Hcg levels has drop and scheduled a repeat lab appointment for next Friday. Patient verbalized understanding.

## 2017-03-17 NOTE — TELEPHONE ENCOUNTER
Called to inform patient that her Hcg levels has dropped to 18 and that she needs to repeat lab work next week. No answer, left message.

## 2017-03-17 NOTE — TELEPHONE ENCOUNTER
----- Message from Ligia Rogers sent at 3/17/2017  4:21 PM CDT -----  Contact: pt   Patient states she is returning a call Patient can be reached at [phone].

## 2017-03-24 ENCOUNTER — TELEPHONE (OUTPATIENT)
Dept: HEPATOLOGY | Facility: CLINIC | Age: 37
End: 2017-03-24

## 2017-03-24 DIAGNOSIS — B18.2 CHRONIC HEPATITIS C WITHOUT HEPATIC COMA: Primary | ICD-10-CM

## 2017-03-24 NOTE — TELEPHONE ENCOUNTER
Spoke to pt. Genotype, cbc, cmp, inr scheduled on 4/7. U/s, fibroscan, and clinic visit on 4/13. Reminders mailed.

## 2024-11-08 ENCOUNTER — HOSPITAL ENCOUNTER (EMERGENCY)
Facility: HOSPITAL | Age: 44
Discharge: HOME | End: 2024-11-08
Attending: EMERGENCY MEDICINE
Payer: MEDICAID

## 2024-11-08 VITALS
WEIGHT: 125 LBS | SYSTOLIC BLOOD PRESSURE: 156 MMHG | DIASTOLIC BLOOD PRESSURE: 80 MMHG | OXYGEN SATURATION: 100 % | HEART RATE: 97 BPM | HEIGHT: 62 IN | RESPIRATION RATE: 18 BRPM | TEMPERATURE: 98 F | BODY MASS INDEX: 23 KG/M2

## 2024-11-08 DIAGNOSIS — R10.9 ABDOMINAL PAIN, UNSPECIFIED ABDOMINAL LOCATION: Primary | ICD-10-CM

## 2024-11-08 PROBLEM — O99.333 PREGNANCY COMPLICATED BY TOBACCO USE IN THIRD TRIMESTER: Status: ACTIVE | Noted: 2020-07-30

## 2024-11-08 PROBLEM — R45.851 SUICIDAL IDEATIONS: Status: ACTIVE | Noted: 2024-08-04

## 2024-11-08 PROBLEM — Z34.90 PREGNANCY: Status: ACTIVE | Noted: 2020-09-22

## 2024-11-08 PROBLEM — D25.0 SUBMUCOUS LEIOMYOMA OF UTERUS: Status: ACTIVE | Noted: 2023-08-02

## 2024-11-08 PROBLEM — O99.213 OBESITY AFFECTING PREGNANCY IN THIRD TRIMESTER: Status: ACTIVE | Noted: 2020-07-09

## 2024-11-08 PROBLEM — F17.200 TOBACCO USE DISORDER: Status: ACTIVE | Noted: 2017-02-21

## 2024-11-08 PROBLEM — R45.850 HOMICIDAL IDEATIONS: Status: ACTIVE | Noted: 2024-08-04

## 2024-11-08 PROBLEM — D28.1 VAGINAL FIBROIDS: Status: ACTIVE | Noted: 2024-11-08

## 2024-11-08 PROBLEM — F19.959 SUBSTANCE-INDUCED PSYCHOTIC DISORDER: Status: ACTIVE | Noted: 2019-03-09

## 2024-11-08 PROBLEM — N93.9 ABNORMAL UTERINE BLEEDING: Status: ACTIVE | Noted: 2023-08-03

## 2024-11-08 LAB
ALBUMIN SERPL BCP-MCNC: 3.3 G/DL (ref 3.5–5.2)
ALP SERPL-CCNC: 78 U/L (ref 40–150)
ALT SERPL W/O P-5'-P-CCNC: 13 U/L (ref 10–44)
ANION GAP SERPL CALC-SCNC: 11 MMOL/L (ref 8–16)
AST SERPL-CCNC: 17 U/L (ref 10–40)
B-HCG UR QL: NEGATIVE
BASOPHILS # BLD AUTO: 0.01 K/UL (ref 0–0.2)
BASOPHILS NFR BLD: 0.1 % (ref 0–1.9)
BILIRUB SERPL-MCNC: 0.7 MG/DL (ref 0.1–1)
BILIRUB UR QL STRIP: NEGATIVE
BUN SERPL-MCNC: 14 MG/DL (ref 6–20)
CALCIUM SERPL-MCNC: 8.9 MG/DL (ref 8.7–10.5)
CHLORIDE SERPL-SCNC: 102 MMOL/L (ref 95–110)
CLARITY UR REFRACT.AUTO: CLEAR
CO2 SERPL-SCNC: 24 MMOL/L (ref 23–29)
COLOR UR AUTO: YELLOW
CREAT SERPL-MCNC: 0.6 MG/DL (ref 0.5–1.4)
CTP QC/QA: YES
DIFFERENTIAL METHOD BLD: ABNORMAL
EOSINOPHIL # BLD AUTO: 0.4 K/UL (ref 0–0.5)
EOSINOPHIL NFR BLD: 6.2 % (ref 0–8)
ERYTHROCYTE [DISTWIDTH] IN BLOOD BY AUTOMATED COUNT: 14 % (ref 11.5–14.5)
EST. GFR  (NO RACE VARIABLE): >60 ML/MIN/1.73 M^2
GLUCOSE SERPL-MCNC: 74 MG/DL (ref 70–110)
GLUCOSE UR QL STRIP: NEGATIVE
HCT VFR BLD AUTO: 37.4 % (ref 37–48.5)
HGB BLD-MCNC: 12.2 G/DL (ref 12–16)
HGB UR QL STRIP: NEGATIVE
IMM GRANULOCYTES # BLD AUTO: 0.01 K/UL (ref 0–0.04)
IMM GRANULOCYTES NFR BLD AUTO: 0.1 % (ref 0–0.5)
KETONES UR QL STRIP: NEGATIVE
LEUKOCYTE ESTERASE UR QL STRIP: NEGATIVE
LIPASE SERPL-CCNC: 12 U/L (ref 4–60)
LYMPHOCYTES # BLD AUTO: 2.9 K/UL (ref 1–4.8)
LYMPHOCYTES NFR BLD: 43.4 % (ref 18–48)
MCH RBC QN AUTO: 26 PG (ref 27–31)
MCHC RBC AUTO-ENTMCNC: 32.6 G/DL (ref 32–36)
MCV RBC AUTO: 80 FL (ref 82–98)
MONOCYTES # BLD AUTO: 0.5 K/UL (ref 0.3–1)
MONOCYTES NFR BLD: 7.2 % (ref 4–15)
NEUTROPHILS # BLD AUTO: 2.9 K/UL (ref 1.8–7.7)
NEUTROPHILS NFR BLD: 43 % (ref 38–73)
NITRITE UR QL STRIP: NEGATIVE
NRBC BLD-RTO: 0 /100 WBC
PH UR STRIP: 6 [PH] (ref 5–8)
PLATELET # BLD AUTO: 257 K/UL (ref 150–450)
PMV BLD AUTO: 11.3 FL (ref 9.2–12.9)
POTASSIUM SERPL-SCNC: 3.4 MMOL/L (ref 3.5–5.1)
PROT SERPL-MCNC: 7.6 G/DL (ref 6–8.4)
PROT UR QL STRIP: NEGATIVE
RBC # BLD AUTO: 4.69 M/UL (ref 4–5.4)
SODIUM SERPL-SCNC: 137 MMOL/L (ref 136–145)
SP GR UR STRIP: 1.01 (ref 1–1.03)
URN SPEC COLLECT METH UR: NORMAL
WBC # BLD AUTO: 6.77 K/UL (ref 3.9–12.7)

## 2024-11-08 PROCEDURE — 81003 URINALYSIS AUTO W/O SCOPE: CPT | Performed by: PHYSICIAN ASSISTANT

## 2024-11-08 PROCEDURE — 81025 URINE PREGNANCY TEST: CPT | Performed by: PHYSICIAN ASSISTANT

## 2024-11-08 PROCEDURE — 80053 COMPREHEN METABOLIC PANEL: CPT | Performed by: PHYSICIAN ASSISTANT

## 2024-11-08 PROCEDURE — 85025 COMPLETE CBC W/AUTO DIFF WBC: CPT | Performed by: PHYSICIAN ASSISTANT

## 2024-11-08 PROCEDURE — 83690 ASSAY OF LIPASE: CPT | Performed by: PHYSICIAN ASSISTANT

## 2024-11-08 PROCEDURE — 99283 EMERGENCY DEPT VISIT LOW MDM: CPT

## 2024-11-08 PROCEDURE — 25000003 PHARM REV CODE 250: Performed by: PHYSICIAN ASSISTANT

## 2024-11-08 RX ORDER — ACETAMINOPHEN 500 MG
1000 TABLET ORAL
Status: COMPLETED | OUTPATIENT
Start: 2024-11-08 | End: 2024-11-08

## 2024-11-08 RX ORDER — ONDANSETRON HYDROCHLORIDE 2 MG/ML
4 INJECTION, SOLUTION INTRAVENOUS
Status: DISCONTINUED | OUTPATIENT
Start: 2024-11-08 | End: 2024-11-08 | Stop reason: HOSPADM

## 2024-11-08 RX ADMIN — ACETAMINOPHEN 1000 MG: 500 TABLET ORAL at 08:11

## 2024-11-09 NOTE — ED NOTES
"  Patient identifiers for Bibiana Monroe 44 y.o. female checked and correct.  Chief Complaint   Patient presents with    Vaginal Bleeding     Pt states she may have had a miscarriage, she was bleeding with cramps from 11/2-11/7 did not take a pregnancy test at home states "usually with my cycle I don't get cramps so I thought I may be pregnant", denies vaginal bleeding and abdominal cramps currently      History reviewed. No pertinent past medical history.  Allergies reported: Review of patient's allergies indicates:  No Known Allergies      HEENT: Denies vision changes. Denies ear drainage or hearing loss. No c/o nasal drainage. Denies dysphagia or voice changes.   Appearance: Pt awake, alert & oriented to person, place & time. Pt in no acute distress at present time. Pt is clean and well groomed with clothes appropriately fastened.   Skin: Skin warm, dry & intact. Color consistent with ethnicity. Mucous membranes moist. No breakdown or brusing noted.   Musculoskeletal: Patient moving all extremities well, no obvious swelling or deformities noted.   Respiratory: Respirations spontaneous, even, and non-labored. Visible chest rise noted. Airway is open and patent. No accessory muscle use noted.   Neurologic: Sensation is intact. Speech is clear and appropriate. Eyes open spontaneously, behavior appropriate to situation, follows commands, facial expression symmetrical, bilateral hand grasp equal and even, purposeful motor response noted.  Cardiac: All peripheral pulses present. No Bilateral lower extremity edema. Cap refill is <3 seconds.  Abdomen: Abdomen soft, non distended, +abd cramping  : Pt voids independently, denies dysuria, hematuria, frequency. +vaginal bleeding  "

## 2024-11-09 NOTE — FIRST PROVIDER EVALUATION
" Emergency Department TeleTriage Encounter Note      CHIEF COMPLAINT    Chief Complaint   Patient presents with    Vaginal Bleeding     Pt states she may have had a miscarriage, she was bleeding with cramps from 11/2-11/7 did not take a pregnancy test at home states "usually with my cycle I don't get cramps so I thought I may be pregnant", denies vaginal bleeding and abdominal cramps currently        VITAL SIGNS   Initial Vitals [11/08/24 1830]   BP Pulse Resp Temp SpO2   (!) 156/80 97 18 97.7 °F (36.5 °C) 100 %      MAP       --            ALLERGIES    Review of patient's allergies indicates:  No Known Allergies    PROVIDER TRIAGE NOTE  This is a teletriage evaluation of a 44 y.o. female presenting to the ED with c/o vaginal bleeding and cramping.  No bleeding at this time.  Pt is not followed by OB.  Pt states he LMP was 2 days ago.  .  .     PE: aggressive during exam.  Not wanting to answer questions.      Plan: urine preg    Limited physical exam via telehealth: The patient is awake, alert, answering questions appropriately and is not in respiratory distress. All ED beds are full at present; patient notified of this status.  Patient seen and medically screened by Nurse Practitioner via teletriage.      Initial orders will be placed and care will be transferred to an alternate provider when patient is roomed for a full evaluation. Any additional orders and the final disposition will be determined by that provider.         ORDERS  Labs Reviewed   POCT URINE PREGNANCY       ED Orders (720h ago, onward)      Start Ordered     Status Ordering Provider    11/08/24 1852 11/08/24 1851  POCT urine pregnancy  Once         Ordered RIGO HIGH              Virtual Visit Note: The provider triage portion of this emergency department evaluation and documentation was performed via [employer], a HIPAA-compliant telemedicine application, in concert with a tele-presenter in the room. A face to face patient evaluation " -Creatinine stable at 2.1   with one of my colleagues will occur once the patient is placed in an emergency department room.      DISCLAIMER: This note was prepared with [employer] voice recognition transcription software. Garbled syntax, mangled pronouns, and other bizarre constructions may be attributed to that software system.

## 2024-11-09 NOTE — ED PROVIDER NOTES
"Encounter Date: 11/8/2024       History     Chief Complaint   Patient presents with    Vaginal Bleeding     Pt states she may have had a miscarriage, she was bleeding with cramps from 11/2-11/7 did not take a pregnancy test at home states "usually with my cycle I don't get cramps so I thought I may be pregnant", denies vaginal bleeding and abdominal cramps currently      44-year-old female with history of substance induced mood disorder, uterine fibroids, hepatitis-C presents for multiple complaints. Her initial presenting complaint is for some pelvic cramping which has since resolved. Reports that she had a menstrual cycle from 11/02/2024 until 11/07/2024. She had a normal amount of bleeding, using about 3 or 4 pads daily but had more cramping than usual and so she is worried that she had a miscarriage.  Also reports that she has been more hungry than usual and feels like her abdomen is distended which prompted her come to the ED for evaluation.    She reports that she has been very stressed recently and is currently homeless.  She feels depressed but is not suicidal.  She does report feeling anxious about her current situation.  She denies thoughts of harming others, hallucinations or delusions. She denies nausea/vomiting, changes in bowel movements, vaginal discharge, fevers/chills or chest pain.      Review of patient's allergies indicates:   Allergen Reactions    Amoxicillin Itching     History reviewed. No pertinent past medical history.  History reviewed. No pertinent surgical history.  Family History   Problem Relation Name Age of Onset    No Known Problems Father      No Known Problems Mother      No Known Problems Brother      No Known Problems Sister       Social History     Tobacco Use    Smoking status: Every Day   Substance Use Topics    Alcohol use: No    Drug use: No     Review of Systems    Physical Exam     Initial Vitals [11/08/24 1830]   BP Pulse Resp Temp SpO2   (!) 156/80 97 18 97.7 °F (36.5 " °C) 100 %      MAP       --         Physical Exam    Nursing note and vitals reviewed.  Constitutional: She appears well-developed and well-nourished. She is not diaphoretic. No distress.   HENT:   Head: Normocephalic and atraumatic.   Cardiovascular:  Normal rate, regular rhythm, normal heart sounds and intact distal pulses.     Exam reveals no gallop and no friction rub.       No murmur heard.  Pulmonary/Chest: Breath sounds normal. No respiratory distress. She has no wheezes. She has no rhonchi. She has no rales. She exhibits no tenderness.   Abdominal: Abdomen is soft. Bowel sounds are normal. She exhibits no distension and no mass. There is abdominal tenderness.    Mild generalized tenderness without rebound or guarding There is no rebound and no guarding.   Musculoskeletal:         General: Normal range of motion.     Neurological: She is alert and oriented to person, place, and time.   Skin: Skin is warm and dry.   Psychiatric: She has a normal mood and affect.         ED Course   Procedures  Labs Reviewed   CBC W/ AUTO DIFFERENTIAL - Abnormal       Result Value    WBC 6.77      RBC 4.69      Hemoglobin 12.2      Hematocrit 37.4      MCV 80 (*)     MCH 26.0 (*)     MCHC 32.6      RDW 14.0      Platelets 257      MPV 11.3      Immature Granulocytes 0.1      Gran # (ANC) 2.9      Immature Grans (Abs) 0.01      Lymph # 2.9      Mono # 0.5      Eos # 0.4      Baso # 0.01      nRBC 0      Gran % 43.0      Lymph % 43.4      Mono % 7.2      Eosinophil % 6.2      Basophil % 0.1      Differential Method Automated     COMPREHENSIVE METABOLIC PANEL - Abnormal    Sodium 137      Potassium 3.4 (*)     Chloride 102      CO2 24      Glucose 74      BUN 14      Creatinine 0.6      Calcium 8.9      Total Protein 7.6      Albumin 3.3 (*)     Total Bilirubin 0.7      Alkaline Phosphatase 78      AST 17      ALT 13      eGFR >60.0      Anion Gap 11     LIPASE    Lipase 12     URINALYSIS, REFLEX TO URINE CULTURE    Specimen UA  Urine, Clean Catch      Color, UA Yellow      Appearance, UA Clear      pH, UA 6.0      Specific Gravity, UA 1.015      Protein, UA Negative      Glucose, UA Negative      Ketones, UA Negative      Bilirubin (UA) Negative      Occult Blood UA Negative      Nitrite, UA Negative      Leukocytes, UA Negative      Narrative:     Specimen Source->Urine   POCT URINE PREGNANCY    POC Preg Test, Ur Negative       Acceptable Yes            Imaging Results    None          Medications   acetaminophen tablet 1,000 mg (1,000 mg Oral Given 11/8/24 2051)     Medical Decision Making   44-year-old female presenting for cramping, abdominal discomfort and concern for pregnancy/miscarriage.  /80 with otherwise normal vitals.  She appears uncomfortable but nontoxic.      Differential diagnosis:    Ectopic pregnancy   Pregnancy   Miscarriage   Lower suspicion for intra-abdominal infection   She is reporting feeling depressed and anxious but is neither suicidal nor homicidal.  I do not feel that she is gravely disabled and do not feel that she requires PEC   At this time.      Will check labs, give analgesics and discuss with ED .     I was informed by nursing staff that the patient had eloped.  I was not able to discuss discharge information for her as she left prior to this discussion.    Amount and/or Complexity of Data Reviewed  Labs: ordered. Decision-making details documented in ED Course.  Radiology: ordered.    Risk  OTC drugs.  Prescription drug management.               ED Course as of 11/09/24 0040   Fri Nov 08, 2024 1955 hCG Qualitative, Urine: Negative [CC]   2128 Leukocyte Esterase, UA: Negative [CC]   2129   Informed by nursing staff that patient eloped [CC]      ED Course User Index  [CC] Aimee Dey PA-C                           Clinical Impression:  Final diagnoses:  [R10.9] Abdominal pain, unspecified abdominal location (Primary)          ED Disposition Condition    Eloped  Aimee Saunders PA-C  11/09/24 0040

## 2024-11-19 ENCOUNTER — PATIENT MESSAGE (OUTPATIENT)
Dept: RESEARCH | Facility: HOSPITAL | Age: 44
End: 2024-11-19
Payer: MEDICAID